# Patient Record
Sex: FEMALE | Race: WHITE
[De-identification: names, ages, dates, MRNs, and addresses within clinical notes are randomized per-mention and may not be internally consistent; named-entity substitution may affect disease eponyms.]

---

## 2017-01-20 NOTE — DX
DEXA Bone Mineral Densitometry



Clinical Indications:  Family history of osteoporosis. Screening.



Comparison: None available.



Technique:  Bone Mineral Densitometry (BMD) by Dual Energy X-Ray Absorptiometry (DEXA) was performed 
utilizing the [employer] scanner.  The lumbar spine was evaluated in the AP projection.  The bilat
eral hips and  forearm were evaluated in the AP projection. Vertebral fracture assessment was also pe
rformed.



AP Lumbar Spine:  The L1, L2, L3 and L4 vertebral bodies were evaluated.

BMD:  0.981 gm/cm2

T-score:  -1.7 SD

Z-score: -0.8 SD



AP left Hip: Total

BMD:  0.665 gm/cm2

T-score:  -2.7 SD

Z-score:  -1.9 SD



AP right Hip: Total

BMD:  0.674 gm/cm2

T-score:  -2.6 SD

Z-score:  -1.8 SD



AP left Forearm, 1/3:

BMD:  0.725 gm/cm2

T-score:  -1.7 SD

Z-score:  -1.5 SD



Vertebral Fracture Assessment:  No significant fracture deformity.



Conclusion: Considering the lowest measured site, the patient  is osteoporotic.

The ten year risk for any major osteoporotic fracture is 21.6% and for a hip fracture is 2.6%.





Consider excluding secondary metabolic causes of bone loss (reported to be present in as many as 30% 
of patients with normal Z scores). Laboratory evaluation might include hydroxy vitamin D3 level, TSH,
 PTH, calcium, 24-hour urine calcium, phosphorous, albumin, creatinine, alkaline phosphatase, serum e
lectrophoresis (SPEP or UPEP),  anti-tissue transglutaminase antibody levels (celiac disease) and CBC
.  If secondary causes are excluded, then consider initiating treatment with a bisphosphonate (such a
s Fosamax, Actonel or Boniva). If the patient is unable to use an oral bisphosphonate, another agent 
such as IV bisphosphonates (Boniva or Reclast), teriparatide (Forteo), or a selective estrogen recept
or modulator (Evista) might be considered.



Supplementing an insufficient diet to achieve total intakes of 1500 mg calcium and 800 International 
Units of vitamin D daily should be considered. Osteoporosis prevention and treatment begins by modify
ing risk factors. The patient should be encouraged to participate in a regular exercise program that 
includes weightbearing and muscle strengthening regimens, as is clinically appropriate.



Recommend follow-up DEXA in one year to assess the efficacy of pharmacologic intervention and/or klarissa
ection of appropriate secondary cause.

## 2018-07-17 ENCOUNTER — HOSPITAL ENCOUNTER (OUTPATIENT)
Dept: HOSPITAL 80 - FIMAGING | Age: 53
End: 2018-07-17
Attending: INTERNAL MEDICINE
Payer: COMMERCIAL

## 2018-07-17 DIAGNOSIS — Z12.31: Primary | ICD-10-CM
